# Patient Record
(demographics unavailable — no encounter records)

---

## 2024-11-06 NOTE — PHYSICAL EXAM
[Well Nourished] : well nourished [Well Developed] : well developed [Well-Appearing] : well-appearing [Normal Sclera/Conjunctiva] : normal sclera/conjunctiva [PERRL] : pupils equal round and reactive to light [EOMI] : extraocular movements intact [Normal Outer Ear/Nose] : the outer ears and nose were normal in appearance [Normal Oropharynx] : the oropharynx was normal [Normal TMs] : both tympanic membranes were normal [Normal Nasal Mucosa] : the nasal mucosa was normal [No JVD] : no jugular venous distention [No Lymphadenopathy] : no lymphadenopathy [Supple] : supple [Thyroid Normal, No Nodules] : the thyroid was normal and there were no nodules present [No Respiratory Distress] : no respiratory distress  [No Accessory Muscle Use] : no accessory muscle use [Clear to Auscultation] : lungs were clear to auscultation bilaterally [Normal Rate] : normal rate  [Regular Rhythm] : with a regular rhythm [Normal S1, S2] : normal S1 and S2 [No Murmur] : no murmur heard [No Carotid Bruits] : no carotid bruits [No Abdominal Bruit] : a ~M bruit was not heard ~T in the abdomen [No Varicosities] : no varicosities [Pedal Pulses Present] : the pedal pulses are present [No Edema] : there was no peripheral edema [No Palpable Aorta] : no palpable aorta [No Extremity Clubbing/Cyanosis] : no extremity clubbing/cyanosis [Normal Appearance] : normal in appearance [No Axillary Lymphadenopathy] : no axillary lymphadenopathy [Soft] : abdomen soft [Non Tender] : non-tender [Non-distended] : non-distended [No Masses] : no abdominal mass palpated [No HSM] : no HSM [Normal Bowel Sounds] : normal bowel sounds [No CVA Tenderness] : no CVA  tenderness [No Spinal Tenderness] : no spinal tenderness [No Joint Swelling] : no joint swelling [Grossly Normal Strength/Tone] : grossly normal strength/tone [No Rash] : no rash [Coordination Grossly Intact] : coordination grossly intact [No Focal Deficits] : no focal deficits [Normal Gait] : normal gait [Normal Affect] : the affect was normal [Normal Insight/Judgement] : insight and judgment were intact

## 2024-11-06 NOTE — HEALTH RISK ASSESSMENT
[Good] : ~his/her~  mood as  good [Yes] : Yes [Monthly or less (1 pt)] : Monthly or less (1 point) [1 or 2 (0 pts)] : 1 or 2 (0 points) [Never (0 pts)] : Never (0 points) [No] : In the past 12 months have you used drugs other than those required for medical reasons? No [No falls in past year] : Patient reported no falls in the past year [0] : 2) Feeling down, depressed, or hopeless: Not at all (0) [Never] : Never [Patient reported mammogram was normal] : Patient reported mammogram was normal [Patient reported PAP Smear was normal] : Patient reported PAP Smear was normal [Patient reported bone density results were normal] : Patient reported bone density results were normal [Patient reported colonoscopy was normal] : Patient reported colonoscopy was normal [HIV test declined] : HIV test declined [Hepatitis C test declined] : Hepatitis C test declined [None] : None [With Significant Other] : lives with significant other [# of Members in Household ___] :  household currently consist of [unfilled] member(s) [Retired] : retired [High School] : high school [] :  [# Of Children ___] : has [unfilled] children [Feels Safe at Home] : Feels safe at home [Fully functional (bathing, dressing, toileting, transferring, walking, feeding)] : Fully functional (bathing, dressing, toileting, transferring, walking, feeding) [Fully functional (using the telephone, shopping, preparing meals, housekeeping, doing laundry, using] : Fully functional and needs no help or supervision to perform IADLs (using the telephone, shopping, preparing meals, housekeeping, doing laundry, using transportation, managing medications and managing finances) [Reports normal functional visual acuity (ie: able to read med bottle)] : Reports normal functional visual acuity [Smoke Detector] : smoke detector [Carbon Monoxide Detector] : carbon monoxide detector [Seat Belt] :  uses seat belt [Sunscreen] : uses sunscreen [With Patient/Caregiver] : , with patient/caregiver [Designated Healthcare Proxy] : Designated healthcare proxy [Name: ___] : Health Care Proxy's Name: [unfilled]  [Relationship: ___] : Relationship: [unfilled] [de-identified] : none [de-identified] : per HPI [de-identified] : rare ETOH [Audit-CScore] : 1 [de-identified] : walks few days  a week/ stretches daily [de-identified] : overall healthy balanced and varied diet without any exclusions -eats only 2 meals a day [[id number]] : 0 [Change in mental status noted] : No change in mental status noted [Language] : denies difficulty with language [Behavior] : denies difficulty with behavior [Learning/Retaining New Information] : denies difficulty learning/retaining new information [Handling Complex Tasks] : denies difficulty handling complex tasks [Reasoning] : denies difficulty with reasoning [Spatial Ability and Orientation] : denies difficulty with spatial ability and orientation [Sexually Active] : not sexually active [High Risk Behavior] : no high risk behavior [Reports changes in hearing] : Reports no changes in hearing [Reports changes in vision] : Reports no changes in vision [Reports changes in dental health] : Reports no changes in dental health [Travel to Developing Areas] : does not  travel to developing areas [TB Exposure] : is not being exposed to tuberculosis [Caregiver Concerns] : does not have caregiver concerns [MammogramDate] : 11/23 [MammogramComments] : with sono at [facility] [PapSmearDate] : 05/24 [PapSmearComments] : with oncologist Dr. 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

## 2024-11-06 NOTE — HISTORY OF PRESENT ILLNESS
[FreeTextEntry1] : Pt. presents for a comprehensive evaluation for multiple medical issues.\par   [de-identified] : PMH notable for: She was diagnosed with endometrial carcinoma in 05/2022--presented with post-menopausal bleeding. She underwent successful robotic assisted Total hysterectomy and BSO in 6/2022 followed by local XRT. She tolerated XRT and is doing well overall. She now gets regular f/u with gyn-onc Dr. Jyoti Pickett q 3months, and will f/u with radiation oncologist in 6months.  Her 40 yr old son is expecting a baby in 3/2023--lives in [place].  She needs a TDap booster shot.  Her hypercholesterolemia is stable on Pravastatin 80mg qd. Her hypothyroidism is stable on Levothyroxine 50mcg qd.  She had a colonoscopy 2/2020-was wnl

## 2024-11-12 NOTE — REASON FOR VISIT
[FreeTextEntry1] : [facility] [place]. [phone] [address] [place] [address]

## 2024-11-12 NOTE — REASON FOR VISIT
[FreeTextEntry1] : [facility] [place]. [phone] [address] [place] [address]

## 2024-11-12 NOTE — HISTORY OF PRESENT ILLNESS
[FreeTextEntry1] : Pt is 74 yo pt with stage IA endometrioid endometrial cancer, grade 3 involving a polyp, negative LVI, negative lymph nodes s/p TRH, BSO, SLND on 6/21/22 s/p vaginal RT on 8/31/22. No changes in medical/surgical history. Presents for surveillance visit.  No vaginal discharge, no bleeding. The patient reports some irritation along the labia on the right, but no pelvic pain, changes in bowel/blader function, no swelling in legs.  MRI pelvis 2/22/24: IMPRESSION: Status post hysterectomy. Stable progressively enhancing focus along the left vaginal cuff measuring 1.4 cm, most likely postsurgical scarring/fibrosis. No lymphadenopathy or evidence of metastatic lesion.

## 2025-05-07 NOTE — ASSESSMENT
[No evidence of disease] : No evidence of disease [FreeTextEntry1] : No appreciable treatment related sequelae .

## 2025-05-07 NOTE — VITALS
[Maximal Pain Intensity: 0/10] : 0/10 [Least Pain Intensity: 0/10] : 0/10 [NoTreatment Scheduled] : no treatment scheduled [90: Able to carry normal activity; minor signs or symptoms of disease.] : 90: Able to carry normal activity; minor signs or symptoms of disease.  [ECOG Performance Status: 1 - Restricted in physically strenuous activity but ambulatory and able to carry out work of a light or sedentary nature] : Performance Status: 1 - Restricted in physically strenuous activity but ambulatory and able to carry out work of a light or sedentary nature, e.g., light house work, office work [Date: ____________] : Patient's last distress assessment performed on [unfilled]. [80: Normal activity with effort; some signs or symptoms of disease.] : 80: Normal activity with effort; some signs or symptoms of disease.

## 2025-05-07 NOTE — LETTER GREETING
[Dear Doctor] : Dear Doctor, [Follow-Up] : Your patient, [unfilled] was seen in my office today for follow-up [Please see my note below.] : Please see my note below. [FreeTextEntry2] : Jyoti Pickett MD

## 2025-05-07 NOTE — LETTER CLOSING
[Sincerely yours,] : Sincerely yours, [FreeTextEntry3] : Sudarshan Carrasco MD\par  Physician in Chief\par  Department of Radiation Medicine\par  [facility]\par  [facility] [place]\par  \par   of Radiation Medicine\par  [facility]\par  at  [facility]\par  \par  Radiation \par  [facility]/\par  [facility] [place]\par  [address]\par  [place], [place] [address]\par  \par  Tel: [phone]\par  Fax: [phone]\par

## 2025-05-07 NOTE — REVIEW OF SYSTEMS
[Anxiety] : anxiety [Negative] : Heme/Lymph [FreeTextEntry5] : hyperlipidemia [FreeTextEntry8] : endometrial carcinoma [FreeTextEntry9] : herniated disc, Right rotator cuff tendinitis [de-identified] : hypothyroid [FreeTextEntry1] : allergic to many antibiotics

## 2025-05-07 NOTE — PHYSICAL EXAM
[Normal] : oriented to person, place and time, the affect was normal, the mood was normal and not anxious [FreeTextEntry1] : deferred [de-identified] :  surgical absence pf uterus and cervix. foreshortened vagina

## 2025-05-07 NOTE — HISTORY OF PRESENT ILLNESS
[FreeTextEntry1] : This 73-year-old female presents for radiation medicine follow-up.  Ms. Sosa is with multiple well compensated comorbidities, s/p a RA TLH/BSO for a FIGO grade 3, stage IA endometrioid carcinoma of the uterus.  She presents for routine follow-up after completing adjuvant brachytherapy on 8/31/2022.  Ms. Sosa tolerated brachytherapy treatments well.  She notes regular formed bowel movements.   Denies vaginal bleeding or discharge. Reports minor intermittent urine leakage since surgery. Reports using small vaginal dilator routinely.    Follows with Dr. Pickett

## 2025-05-07 NOTE — DISEASE MANAGEMENT
[Pathological] : TNM Stage: p [IA] : IA [FreeTextEntry4] : endometrial adenocarcinoma [TTNM] : 1a [NTNM] : 0 [MTNM] : 0 [de-identified] : 2100 cGy [de-identified] : vaginal cuff